# Patient Record
Sex: MALE | Race: WHITE | NOT HISPANIC OR LATINO | Employment: OTHER | URBAN - METROPOLITAN AREA
[De-identification: names, ages, dates, MRNs, and addresses within clinical notes are randomized per-mention and may not be internally consistent; named-entity substitution may affect disease eponyms.]

---

## 2020-05-29 PROBLEM — D64.9 ANEMIA: Status: ACTIVE | Noted: 2020-05-29

## 2020-05-29 PROBLEM — E11.9 TYPE 2 DIABETES MELLITUS: Status: ACTIVE | Noted: 2020-05-29

## 2020-05-29 PROBLEM — I10 ESSENTIAL HYPERTENSION: Status: ACTIVE | Noted: 2020-05-29

## 2020-06-14 ENCOUNTER — NURSE TRIAGE (OUTPATIENT)
Dept: ADMINISTRATIVE | Facility: CLINIC | Age: 80
End: 2020-06-14

## 2020-06-14 NOTE — TELEPHONE ENCOUNTER
COVID19 Post Procedure Tracker Outreach:  Pt contacted through Post Procedural Symptom Tracking. Denies any cough, fever, or difficulty breathing since procedure.  Patient appreciative of outreach.  Pt instructed to call back  with any changes of condition or concerns. Verbalized understanding.     Reason for Disposition   Health Information question, no triage required and triager able to answer question    Protocols used: INFORMATION ONLY CALL - NO TRIAGE-P-AH

## 2020-08-05 PROBLEM — D3A.00 CARCINOID TUMOR: Status: ACTIVE | Noted: 2020-08-05

## 2020-08-06 PROBLEM — C7A.022: Status: ACTIVE | Noted: 2020-08-06

## 2020-08-06 PROBLEM — C7A.00 MALIGNANT CARCINOID TUMOR: Status: RESOLVED | Noted: 2020-08-05 | Resolved: 2020-08-06

## 2020-08-06 PROBLEM — C7A.00 MALIGNANT CARCINOID TUMOR: Status: ACTIVE | Noted: 2020-08-05

## 2020-08-08 PROBLEM — C7A.00 MALIGNANT CARCINOID TUMOR: Status: ACTIVE | Noted: 2020-08-08
